# Patient Record
Sex: FEMALE | Race: ASIAN
[De-identification: names, ages, dates, MRNs, and addresses within clinical notes are randomized per-mention and may not be internally consistent; named-entity substitution may affect disease eponyms.]

---

## 2022-10-24 ENCOUNTER — NON-APPOINTMENT (OUTPATIENT)
Age: 27
End: 2022-10-24

## 2024-02-24 ENCOUNTER — NON-APPOINTMENT (OUTPATIENT)
Age: 29
End: 2024-02-24

## 2024-02-26 ENCOUNTER — APPOINTMENT (OUTPATIENT)
Dept: INTERNAL MEDICINE | Facility: CLINIC | Age: 29
End: 2024-02-26
Payer: COMMERCIAL

## 2024-02-26 VITALS
SYSTOLIC BLOOD PRESSURE: 144 MMHG | WEIGHT: 93.56 LBS | BODY MASS INDEX: 16.79 KG/M2 | DIASTOLIC BLOOD PRESSURE: 83 MMHG | TEMPERATURE: 98.4 F | HEIGHT: 62.5 IN | OXYGEN SATURATION: 99 % | HEART RATE: 100 BPM

## 2024-02-26 DIAGNOSIS — Z80.1 FAMILY HISTORY OF MALIGNANT NEOPLASM OF TRACHEA, BRONCHUS AND LUNG: ICD-10-CM

## 2024-02-26 DIAGNOSIS — J32.9 CHRONIC SINUSITIS, UNSPECIFIED: ICD-10-CM

## 2024-02-26 DIAGNOSIS — Z80.0 FAMILY HISTORY OF MALIGNANT NEOPLASM OF DIGESTIVE ORGANS: ICD-10-CM

## 2024-02-26 DIAGNOSIS — Z00.00 ENCOUNTER FOR GENERAL ADULT MEDICAL EXAMINATION W/OUT ABNORMAL FINDINGS: ICD-10-CM

## 2024-02-26 DIAGNOSIS — E04.9 NONTOXIC GOITER, UNSPECIFIED: ICD-10-CM

## 2024-02-26 DIAGNOSIS — Z78.9 OTHER SPECIFIED HEALTH STATUS: ICD-10-CM

## 2024-02-26 PROCEDURE — 99203 OFFICE O/P NEW LOW 30 MIN: CPT

## 2024-02-26 PROCEDURE — G2211 COMPLEX E/M VISIT ADD ON: CPT

## 2024-02-26 RX ORDER — UBIDECARENONE/VIT E ACET 100MG-5
CAPSULE ORAL
Refills: 0 | Status: ACTIVE | COMMUNITY

## 2024-02-26 RX ORDER — FLUTICASONE PROPIONATE 50 UG/1
50 SPRAY, METERED NASAL DAILY
Qty: 1 | Refills: 0 | Status: ACTIVE | COMMUNITY
Start: 2024-02-26 | End: 1900-01-01

## 2024-02-26 RX ORDER — AMOXICILLIN AND CLAVULANATE POTASSIUM 875; 125 MG/1; MG/1
875-125 TABLET, COATED ORAL TWICE DAILY
Qty: 20 | Refills: 0 | Status: ACTIVE | COMMUNITY
Start: 2024-02-26 | End: 1900-01-01

## 2024-02-26 NOTE — PLAN
[FreeTextEntry1] : Plan as above. Rx sent. Pt advised to sign up for [facility] portal to review labs and communicate any questions or concerns directly. Yearly physical and return as needed for illness, medication refills, and new or existing complaints.

## 2024-02-26 NOTE — HEALTH RISK ASSESSMENT
[No] : No [0] : 2) Feeling down, depressed, or hopeless: Not at all (0) [PHQ-2 Negative - No further assessment needed] : PHQ-2 Negative - No further assessment needed [[id number]] : 0 [Never] : Never

## 2024-02-26 NOTE — HISTORY OF PRESENT ILLNESS
[FreeTextEntry8] : 28 year old F with no significant PMH presents for establish care and complains of sinusitis. Pt denies CP/SOB, fever/chills, n/v/d/c.

## 2024-02-26 NOTE — PHYSICAL EXAM
[No Acute Distress] : no acute distress [Well-Appearing] : well-appearing [Normal Outer Ear/Nose] : the outer ears and nose were normal in appearance [No JVD] : no jugular venous distention [Normal TMs] : both tympanic membranes were normal [No Lymphadenopathy] : no lymphadenopathy [Supple] : supple [No Respiratory Distress] : no respiratory distress  [No Accessory Muscle Use] : no accessory muscle use [Clear to Auscultation] : lungs were clear to auscultation bilaterally [Regular Rhythm] : with a regular rhythm [Normal Rate] : normal rate  [Normal S1, S2] : normal S1 and S2 [Coordination Grossly Intact] : coordination grossly intact [No Murmur] : no murmur heard [No Focal Deficits] : no focal deficits [Normal Gait] : normal gait [Normal Affect] : the affect was normal [de-identified] : erythema mucosa of nose [Normal Insight/Judgement] : insight and judgment were intact [de-identified] : erythema throat